# Patient Record
Sex: MALE | Race: WHITE | NOT HISPANIC OR LATINO
[De-identification: names, ages, dates, MRNs, and addresses within clinical notes are randomized per-mention and may not be internally consistent; named-entity substitution may affect disease eponyms.]

---

## 2018-08-22 ENCOUNTER — TRANSCRIPTION ENCOUNTER (OUTPATIENT)
Age: 78
End: 2018-08-22

## 2019-01-17 PROBLEM — Z00.00 ENCOUNTER FOR PREVENTIVE HEALTH EXAMINATION: Status: ACTIVE | Noted: 2019-01-17

## 2019-03-01 ENCOUNTER — APPOINTMENT (OUTPATIENT)
Dept: OPHTHALMOLOGY | Facility: CLINIC | Age: 79
End: 2019-03-01
Payer: MEDICARE

## 2019-03-01 DIAGNOSIS — H25.13 AGE-RELATED NUCLEAR CATARACT, BILATERAL: ICD-10-CM

## 2019-03-01 DIAGNOSIS — H35.30 UNSPECIFIED MACULAR DEGENERATION: ICD-10-CM

## 2019-03-01 PROCEDURE — 92004 COMPRE OPH EXAM NEW PT 1/>: CPT

## 2019-03-01 PROCEDURE — 92134 CPTRZ OPH DX IMG PST SGM RTA: CPT

## 2019-03-01 PROCEDURE — 92136 OPHTHALMIC BIOMETRY: CPT

## 2019-03-05 PROBLEM — H25.13 SENILE NUCLEAR CATARACT, BILATERAL: Status: ACTIVE | Noted: 2019-03-05

## 2019-03-05 PROBLEM — H35.30 AMD (AGE RELATED MACULAR DEGENERATION): Status: ACTIVE | Noted: 2019-03-05

## 2019-03-29 ENCOUNTER — APPOINTMENT (OUTPATIENT)
Dept: OPHTHALMOLOGY | Facility: CLINIC | Age: 79
End: 2019-03-29
Payer: MEDICARE

## 2019-03-29 DIAGNOSIS — H40.003 PREGLAUCOMA, UNSPECIFIED, BILATERAL: ICD-10-CM

## 2019-03-29 PROCEDURE — 92012 INTRM OPH EXAM EST PATIENT: CPT

## 2019-03-29 PROCEDURE — 92083 EXTENDED VISUAL FIELD XM: CPT

## 2019-03-29 RX ORDER — TRAVOPROST 0.04 MG/ML
0 SOLUTION/ DROPS OPHTHALMIC
Qty: 1 | Refills: 6 | Status: ACTIVE | COMMUNITY
Start: 2019-03-29 | End: 1900-01-01

## 2019-04-17 ENCOUNTER — APPOINTMENT (OUTPATIENT)
Dept: OPHTHALMOLOGY | Facility: CLINIC | Age: 79
End: 2019-04-17
Payer: MEDICARE

## 2019-04-17 ENCOUNTER — APPOINTMENT (OUTPATIENT)
Dept: OPHTHALMOLOGY | Facility: CLINIC | Age: 79
End: 2019-04-17

## 2019-04-17 PROCEDURE — 92020 GONIOSCOPY: CPT

## 2019-04-17 PROCEDURE — 92014 COMPRE OPH EXAM EST PT 1/>: CPT

## 2019-04-17 RX ORDER — TRAVOPROST 0.04 MG/ML
0 SOLUTION/ DROPS OPHTHALMIC
Qty: 1 | Refills: 3 | Status: ACTIVE | COMMUNITY
Start: 2019-04-17 | End: 1900-01-01

## 2019-07-24 ENCOUNTER — NON-APPOINTMENT (OUTPATIENT)
Age: 79
End: 2019-07-24

## 2019-07-24 ENCOUNTER — APPOINTMENT (OUTPATIENT)
Dept: OPHTHALMOLOGY | Facility: CLINIC | Age: 79
End: 2019-07-24
Payer: MEDICARE

## 2019-07-24 PROCEDURE — 92012 INTRM OPH EXAM EST PATIENT: CPT

## 2019-07-24 PROCEDURE — 92083 EXTENDED VISUAL FIELD XM: CPT

## 2019-08-13 ENCOUNTER — NON-APPOINTMENT (OUTPATIENT)
Age: 79
End: 2019-08-13

## 2019-08-13 ENCOUNTER — APPOINTMENT (OUTPATIENT)
Dept: OPHTHALMOLOGY | Facility: CLINIC | Age: 79
End: 2019-08-13
Payer: MEDICARE

## 2019-08-13 PROCEDURE — 92012 INTRM OPH EXAM EST PATIENT: CPT

## 2019-09-26 ENCOUNTER — APPOINTMENT (OUTPATIENT)
Dept: OPHTHALMOLOGY | Facility: CLINIC | Age: 79
End: 2019-09-26

## 2019-09-26 ENCOUNTER — APPOINTMENT (OUTPATIENT)
Dept: OPHTHALMOLOGY | Facility: CLINIC | Age: 79
End: 2019-09-26
Payer: MEDICARE

## 2019-09-26 ENCOUNTER — NON-APPOINTMENT (OUTPATIENT)
Age: 79
End: 2019-09-26

## 2019-09-26 PROCEDURE — 92331: CPT | Mod: NC

## 2019-12-05 ENCOUNTER — APPOINTMENT (OUTPATIENT)
Dept: OPHTHALMOLOGY | Facility: CLINIC | Age: 79
End: 2019-12-05
Payer: MEDICARE

## 2019-12-05 ENCOUNTER — NON-APPOINTMENT (OUTPATIENT)
Age: 79
End: 2019-12-05

## 2019-12-05 PROCEDURE — 92012 INTRM OPH EXAM EST PATIENT: CPT

## 2019-12-11 ENCOUNTER — APPOINTMENT (OUTPATIENT)
Dept: OPHTHALMOLOGY | Facility: AMBULATORY SURGERY CENTER | Age: 79
End: 2019-12-11

## 2019-12-18 ENCOUNTER — NON-APPOINTMENT (OUTPATIENT)
Age: 79
End: 2019-12-18

## 2019-12-18 ENCOUNTER — TRANSCRIPTION ENCOUNTER (OUTPATIENT)
Age: 79
End: 2019-12-18

## 2019-12-18 ENCOUNTER — OUTPATIENT (OUTPATIENT)
Dept: OUTPATIENT SERVICES | Facility: HOSPITAL | Age: 79
LOS: 1 days | Discharge: HOME | End: 2019-12-18

## 2019-12-18 ENCOUNTER — APPOINTMENT (OUTPATIENT)
Dept: OPHTHALMOLOGY | Facility: AMBULATORY SURGERY CENTER | Age: 79
End: 2019-12-18
Payer: MEDICARE

## 2019-12-18 PROCEDURE — 66984 XCAPSL CTRC RMVL W/O ECP: CPT | Mod: RT

## 2019-12-19 ENCOUNTER — NON-APPOINTMENT (OUTPATIENT)
Age: 79
End: 2019-12-19

## 2019-12-19 ENCOUNTER — APPOINTMENT (OUTPATIENT)
Dept: OPHTHALMOLOGY | Facility: CLINIC | Age: 79
End: 2019-12-19
Payer: MEDICARE

## 2019-12-19 PROCEDURE — 99024 POSTOP FOLLOW-UP VISIT: CPT

## 2019-12-24 ENCOUNTER — APPOINTMENT (OUTPATIENT)
Dept: OPHTHALMOLOGY | Facility: CLINIC | Age: 79
End: 2019-12-24
Payer: MEDICARE

## 2019-12-24 ENCOUNTER — NON-APPOINTMENT (OUTPATIENT)
Age: 79
End: 2019-12-24

## 2019-12-24 PROCEDURE — 99024 POSTOP FOLLOW-UP VISIT: CPT

## 2020-01-23 ENCOUNTER — APPOINTMENT (OUTPATIENT)
Dept: OPHTHALMOLOGY | Facility: CLINIC | Age: 80
End: 2020-01-23
Payer: MEDICARE

## 2020-01-23 ENCOUNTER — NON-APPOINTMENT (OUTPATIENT)
Age: 80
End: 2020-01-23

## 2020-01-23 PROCEDURE — 99024 POSTOP FOLLOW-UP VISIT: CPT

## 2020-01-23 PROCEDURE — 92134 CPTRZ OPH DX IMG PST SGM RTA: CPT

## 2020-01-28 ENCOUNTER — APPOINTMENT (OUTPATIENT)
Dept: OPHTHALMOLOGY | Facility: CLINIC | Age: 80
End: 2020-01-28
Payer: MEDICARE

## 2020-01-28 ENCOUNTER — NON-APPOINTMENT (OUTPATIENT)
Age: 80
End: 2020-01-28

## 2020-01-28 PROCEDURE — 92012 INTRM OPH EXAM EST PATIENT: CPT | Mod: 24

## 2020-01-28 PROCEDURE — 92133 CPTRZD OPH DX IMG PST SGM ON: CPT

## 2020-01-28 PROCEDURE — 92083 EXTENDED VISUAL FIELD XM: CPT

## 2020-02-27 ENCOUNTER — APPOINTMENT (OUTPATIENT)
Dept: OTOLARYNGOLOGY | Facility: CLINIC | Age: 80
End: 2020-02-27
Payer: MEDICARE

## 2020-02-27 PROCEDURE — V5010 ASSESSMENT FOR HEARING AID: CPT

## 2020-03-13 ENCOUNTER — APPOINTMENT (OUTPATIENT)
Dept: PHARMACY | Facility: CLINIC | Age: 80
End: 2020-03-13

## 2020-04-29 ENCOUNTER — APPOINTMENT (OUTPATIENT)
Dept: OPHTHALMOLOGY | Facility: CLINIC | Age: 80
End: 2020-04-29
Payer: MEDICARE

## 2020-04-29 ENCOUNTER — NON-APPOINTMENT (OUTPATIENT)
Age: 80
End: 2020-04-29

## 2020-04-29 PROCEDURE — 99212 OFFICE O/P EST SF 10 MIN: CPT | Mod: 95

## 2020-05-26 ENCOUNTER — APPOINTMENT (OUTPATIENT)
Dept: OPHTHALMOLOGY | Facility: CLINIC | Age: 80
End: 2020-05-26

## 2020-06-30 ENCOUNTER — APPOINTMENT (OUTPATIENT)
Dept: OPHTHALMOLOGY | Facility: CLINIC | Age: 80
End: 2020-06-30

## 2020-07-23 ENCOUNTER — APPOINTMENT (OUTPATIENT)
Dept: OPHTHALMOLOGY | Facility: CLINIC | Age: 80
End: 2020-07-23

## 2022-01-07 ENCOUNTER — NON-APPOINTMENT (OUTPATIENT)
Age: 82
End: 2022-01-07

## 2022-01-07 ENCOUNTER — APPOINTMENT (OUTPATIENT)
Dept: OPHTHALMOLOGY | Facility: CLINIC | Age: 82
End: 2022-01-07
Payer: MEDICARE

## 2022-01-07 PROCEDURE — 92134 CPTRZ OPH DX IMG PST SGM RTA: CPT

## 2022-01-07 PROCEDURE — 92014 COMPRE OPH EXAM EST PT 1/>: CPT

## 2022-02-28 ENCOUNTER — TRANSCRIPTION ENCOUNTER (OUTPATIENT)
Age: 82
End: 2022-02-28

## 2022-02-28 RX ORDER — OFLOXACIN 0.3 %
1 DROPS OPHTHALMIC (EYE)
Refills: 0 | Status: DISCONTINUED | OUTPATIENT
Start: 2022-03-02 | End: 2022-03-02

## 2022-02-28 RX ORDER — KETOROLAC TROMETHAMINE 0.5 %
1 DROPS OPHTHALMIC (EYE)
Refills: 0 | Status: DISCONTINUED | OUTPATIENT
Start: 2022-03-02 | End: 2022-03-02

## 2022-02-28 RX ORDER — SODIUM CHLORIDE 9 MG/ML
1000 INJECTION, SOLUTION INTRAVENOUS
Refills: 0 | Status: DISCONTINUED | OUTPATIENT
Start: 2022-03-02 | End: 2022-03-02

## 2022-02-28 RX ORDER — TROPICAMIDE 1 %
1 DROPS OPHTHALMIC (EYE)
Refills: 0 | Status: DISCONTINUED | OUTPATIENT
Start: 2022-03-02 | End: 2022-03-02

## 2022-02-28 RX ORDER — PHENYLEPHRINE HCL 2.5 %
1 DROPS OPHTHALMIC (EYE)
Refills: 0 | Status: DISCONTINUED | OUTPATIENT
Start: 2022-03-02 | End: 2022-03-02

## 2022-02-28 NOTE — OPERATIVE REPORT - OPERATIVE RPOSRT DETAILS
DATE OF SURGERY:    PRE-OP DIAGNOSIS: Cataract Left Eye    POST-OP DIAGNOSIS: Same    ANESTHESIA: MAC    PROCEDURE: Cataract extraction with intraocular lens implant left eye    SPECIMEN/TISSUE REMOVED: None    ESTIMATED BLOOD LOSS: < 1mL    COMPLICATIONS: None    The patient was brought to the operating room.  A drop of topical anesthetic was placed in the eye. The patient was prepped and draped in the usual sterile fashion, including Betadine drops in the eye. A lid speculum was placed between the lids of the left eye. A paracentesis was made inferior. Intracameral preservative free lidocaine was instilled and the anterior chamber was filled with air, trypan blue, and viscoelastic.. A clear cornea temporal incision was created using a 2.4mm keratome. A continuous curvilinear capsulorhexis was started with a cystotome and completed with capsulorhexis forceps. The lens was hydrodissected with BSS. The lens was then phacoemulsified using a divide and conquer technique. Residual cortical material was removed using automated irrigation and aspiration. The capsular bag was reformed using a viscoelastic. A ______ lens was inserted into the bag. Symmetric capsular bag fixation was confirmed. The remaining viscoelastic was removed with automated irrigation and aspiration. The wounds were hydrated and checked to be water tight. The lid speculum was removed. Antibiotic/steroid ointment was instilled in the eye and a shield was placed over the eye. The patient left the OR in stable condition having tolerated the procedure well. Arlen HUYNH was present throughout the case. DATE OF SURGERY:    PRE-OP DIAGNOSIS: Cataract Left Eye    POST-OP DIAGNOSIS: Same    ANESTHESIA: MAC    PROCEDURE: Cataract extraction with intraocular lens implant left eye    SPECIMEN/TISSUE REMOVED: None    ESTIMATED BLOOD LOSS: < 1mL    COMPLICATIONS: None    The patient was brought to the operating room.  A drop of topical anesthetic was placed in the eye. The patient was prepped and draped in the usual sterile fashion, including Betadine drops in the eye. A lid speculum was placed between the lids of the left eye. A paracentesis was made inferior. Intracameral preservative free lidocaine was instilled and the anterior chamber was filled with air, trypan blue, and viscoelastic.. A clear cornea temporal incision was created using a 2.4mm keratome. A continuous curvilinear capsulorhexis was started with a cystotome and completed with capsulorhexis forceps. The lens was hydrodissected with BSS. The lens was then phacoemulsified using a divide and conquer technique. Residual cortical material was removed using automated irrigation and aspiration. The capsular bag was reformed using a viscoelastic. A _SN60WF 19.50 lens was inserted into the bag. Symmetric capsular bag fixation was confirmed. The remaining viscoelastic was removed with automated irrigation and aspiration. The wounds were hydrated and checked to be water tight. The lid speculum was removed. Antibiotic/steroid ointment was instilled in the eye and a shield was placed over the eye. The patient left the OR in stable condition having tolerated the procedure well. Arlen HUYNH was present throughout the case. DATE OF SURGERY:3/2/2022    Surgeon: Arlen Hubbard    PRE-OP DIAGNOSIS: Cataract Left Eye    POST-OP DIAGNOSIS: Same    ANESTHESIA: MAC    PROCEDURE: Cataract extraction with intraocular lens implant left eye    SPECIMEN/TISSUE REMOVED: None    ESTIMATED BLOOD LOSS: < 1mL    COMPLICATIONS: None    The patient was brought to the operating room.  A drop of topical anesthetic was placed in the eye. The patient was prepped and draped in the usual sterile fashion, including Betadine drops in the eye. A lid speculum was placed between the lids of the left eye. A paracentesis was made inferior. Intracameral preservative free lidocaine was instilled and the anterior chamber was filled with air, trypan blue, and viscoelastic.. A clear cornea temporal incision was created using a 2.4mm keratome. A continuous curvilinear capsulorhexis was started with a cystotome and completed with capsulorhexis forceps. The lens was hydrodissected with BSS. The lens was then phacoemulsified using a divide and conquer technique. Residual cortical material was removed using automated irrigation and aspiration. The capsular bag was reformed using a viscoelastic. A _SN60WF 19.50 lens was inserted into the bag. Symmetric capsular bag fixation was confirmed. The remaining viscoelastic was removed with automated irrigation and aspiration. The wounds were hydrated and checked to be water tight. The lid speculum was removed. Antibiotic/steroid ointment was instilled in the eye and a shield was placed over the eye. The patient left the OR in stable condition having tolerated the procedure well. IArlen was present throughout the case.

## 2022-03-01 NOTE — ASU PATIENT PROFILE, ADULT - FALL HARM RISK - UNIVERSAL INTERVENTIONS
Bed in lowest position, wheels locked, appropriate side rails in place/Call bell, personal items and telephone in reach/Instruct patient to call for assistance before getting out of bed or chair/Non-slip footwear when patient is out of bed/[place] to call system/Physically safe environment - no spills, clutter or unnecessary equipment/Purposeful Proactive Rounding/Room/bathroom lighting operational, light cord in reach

## 2022-03-02 ENCOUNTER — OUTPATIENT (OUTPATIENT)
Dept: OUTPATIENT SERVICES | Facility: HOSPITAL | Age: 82
LOS: 1 days | Discharge: HOME | End: 2022-03-02
Payer: MEDICARE

## 2022-03-02 ENCOUNTER — NON-APPOINTMENT (OUTPATIENT)
Age: 82
End: 2022-03-02

## 2022-03-02 ENCOUNTER — APPOINTMENT (OUTPATIENT)
Dept: OPHTHALMOLOGY | Facility: AMBULATORY SURGERY CENTER | Age: 82
End: 2022-03-02

## 2022-03-02 VITALS
TEMPERATURE: 98 F | HEART RATE: 52 BPM | DIASTOLIC BLOOD PRESSURE: 70 MMHG | HEIGHT: 67 IN | RESPIRATION RATE: 16 BRPM | WEIGHT: 158.51 LBS | SYSTOLIC BLOOD PRESSURE: 136 MMHG | OXYGEN SATURATION: 97 %

## 2022-03-02 VITALS
DIASTOLIC BLOOD PRESSURE: 78 MMHG | TEMPERATURE: 98 F | HEART RATE: 57 BPM | RESPIRATION RATE: 16 BRPM | OXYGEN SATURATION: 100 % | SYSTOLIC BLOOD PRESSURE: 152 MMHG

## 2022-03-02 DIAGNOSIS — Z98.49 CATARACT EXTRACTION STATUS, UNSPECIFIED EYE: Chronic | ICD-10-CM

## 2022-03-02 PROCEDURE — 66984 XCAPSL CTRC RMVL W/O ECP: CPT | Mod: LT

## 2022-03-02 DEVICE — LENS IOL ACRYSOF SN60WF 19.5D
Type: IMPLANTABLE DEVICE | Status: NON-FUNCTIONAL
Removed: 2022-03-02

## 2022-03-02 RX ORDER — SODIUM CHLORIDE 9 MG/ML
500 INJECTION, SOLUTION INTRAVENOUS
Refills: 0 | Status: DISCONTINUED | OUTPATIENT
Start: 2022-03-02 | End: 2022-03-02

## 2022-03-02 RX ORDER — SIMVASTATIN 20 MG/1
1 TABLET, FILM COATED ORAL
Qty: 0 | Refills: 0 | DISCHARGE

## 2022-03-02 RX ORDER — ACETAMINOPHEN 500 MG
650 TABLET ORAL ONCE
Refills: 0 | Status: DISCONTINUED | OUTPATIENT
Start: 2022-03-02 | End: 2022-03-02

## 2022-03-02 RX ORDER — ASPIRIN/CALCIUM CARB/MAGNESIUM 324 MG
1 TABLET ORAL
Qty: 0 | Refills: 0 | DISCHARGE

## 2022-03-02 RX ORDER — METOPROLOL TARTRATE 50 MG
1 TABLET ORAL
Qty: 0 | Refills: 0 | DISCHARGE

## 2022-03-02 RX ORDER — LORATADINE 10 MG/1
1 TABLET ORAL
Qty: 0 | Refills: 0 | DISCHARGE

## 2022-03-02 RX ORDER — ZOLPIDEM TARTRATE 10 MG/1
1 TABLET ORAL
Qty: 0 | Refills: 0 | DISCHARGE

## 2022-03-02 RX ORDER — AMLODIPINE BESYLATE 2.5 MG/1
1 TABLET ORAL
Qty: 0 | Refills: 0 | DISCHARGE

## 2022-03-02 NOTE — ASU DISCHARGE PLAN (ADULT/PEDIATRIC) - NS MD DC FALL RISK RISK
For information on Fall & Injury Prevention, visit: [email or web address] OR  [email or web address] OR  https://www.cdc.gov/steadi/patient.html

## 2022-03-03 ENCOUNTER — APPOINTMENT (OUTPATIENT)
Dept: OPHTHALMOLOGY | Facility: CLINIC | Age: 82
End: 2022-03-03
Payer: MEDICARE

## 2022-03-03 ENCOUNTER — NON-APPOINTMENT (OUTPATIENT)
Age: 82
End: 2022-03-03

## 2022-03-03 PROCEDURE — 99024 POSTOP FOLLOW-UP VISIT: CPT

## 2022-03-09 ENCOUNTER — NON-APPOINTMENT (OUTPATIENT)
Age: 82
End: 2022-03-09

## 2022-03-09 ENCOUNTER — APPOINTMENT (OUTPATIENT)
Dept: OPHTHALMOLOGY | Facility: CLINIC | Age: 82
End: 2022-03-09
Payer: MEDICARE

## 2022-03-09 PROBLEM — I34.0 NONRHEUMATIC MITRAL (VALVE) INSUFFICIENCY: Chronic | Status: ACTIVE | Noted: 2022-03-02

## 2022-03-09 PROBLEM — I10 ESSENTIAL (PRIMARY) HYPERTENSION: Chronic | Status: ACTIVE | Noted: 2022-03-02

## 2022-03-09 PROCEDURE — 99024 POSTOP FOLLOW-UP VISIT: CPT

## 2022-04-11 ENCOUNTER — NON-APPOINTMENT (OUTPATIENT)
Age: 82
End: 2022-04-11

## 2022-04-11 ENCOUNTER — APPOINTMENT (OUTPATIENT)
Dept: OPHTHALMOLOGY | Facility: CLINIC | Age: 82
End: 2022-04-11
Payer: MEDICARE

## 2022-04-11 PROCEDURE — 99024 POSTOP FOLLOW-UP VISIT: CPT

## 2022-04-12 ENCOUNTER — TRANSCRIPTION ENCOUNTER (OUTPATIENT)
Age: 82
End: 2022-04-12

## 2022-08-17 ENCOUNTER — NON-APPOINTMENT (OUTPATIENT)
Age: 82
End: 2022-08-17

## 2022-08-17 ENCOUNTER — APPOINTMENT (OUTPATIENT)
Dept: OPHTHALMOLOGY | Facility: CLINIC | Age: 82
End: 2022-08-17

## 2022-08-17 PROCEDURE — 92012 INTRM OPH EXAM EST PATIENT: CPT

## 2022-08-17 PROCEDURE — 92134 CPTRZ OPH DX IMG PST SGM RTA: CPT

## 2022-08-29 ENCOUNTER — NON-APPOINTMENT (OUTPATIENT)
Age: 82
End: 2022-08-29

## 2022-09-01 ENCOUNTER — APPOINTMENT (OUTPATIENT)
Dept: OPHTHALMOLOGY | Facility: CLINIC | Age: 82
End: 2022-09-01

## 2022-09-01 ENCOUNTER — NON-APPOINTMENT (OUTPATIENT)
Age: 82
End: 2022-09-01

## 2022-09-01 PROCEDURE — 92014 COMPRE OPH EXAM EST PT 1/>: CPT

## 2022-09-01 PROCEDURE — 92250 FUNDUS PHOTOGRAPHY W/I&R: CPT

## 2022-11-16 ENCOUNTER — APPOINTMENT (OUTPATIENT)
Dept: OPHTHALMOLOGY | Facility: CLINIC | Age: 82
End: 2022-11-16

## 2022-11-16 ENCOUNTER — NON-APPOINTMENT (OUTPATIENT)
Age: 82
End: 2022-11-16

## 2022-11-16 PROCEDURE — 92014 COMPRE OPH EXAM EST PT 1/>: CPT

## 2022-11-16 PROCEDURE — 92133 CPTRZD OPH DX IMG PST SGM ON: CPT

## 2022-11-16 PROCEDURE — 92083 EXTENDED VISUAL FIELD XM: CPT

## 2023-03-22 ENCOUNTER — NON-APPOINTMENT (OUTPATIENT)
Age: 83
End: 2023-03-22

## 2023-03-22 ENCOUNTER — APPOINTMENT (OUTPATIENT)
Dept: OPHTHALMOLOGY | Facility: CLINIC | Age: 83
End: 2023-03-22
Payer: MEDICARE

## 2023-03-22 PROCEDURE — 92012 INTRM OPH EXAM EST PATIENT: CPT

## 2023-05-16 ENCOUNTER — NON-APPOINTMENT (OUTPATIENT)
Age: 83
End: 2023-05-16

## 2023-05-16 ENCOUNTER — APPOINTMENT (OUTPATIENT)
Dept: OPHTHALMOLOGY | Facility: CLINIC | Age: 83
End: 2023-05-16
Payer: MEDICARE

## 2023-05-16 PROCEDURE — 92014 COMPRE OPH EXAM EST PT 1/>: CPT

## 2023-09-07 ENCOUNTER — APPOINTMENT (OUTPATIENT)
Dept: OPHTHALMOLOGY | Facility: CLINIC | Age: 83
End: 2023-09-07
Payer: MEDICARE

## 2023-09-07 ENCOUNTER — NON-APPOINTMENT (OUTPATIENT)
Age: 83
End: 2023-09-07

## 2023-09-07 PROCEDURE — 92250 FUNDUS PHOTOGRAPHY W/I&R: CPT

## 2023-09-07 PROCEDURE — 92014 COMPRE OPH EXAM EST PT 1/>: CPT

## 2023-10-11 ENCOUNTER — APPOINTMENT (OUTPATIENT)
Dept: OPHTHALMOLOGY | Facility: CLINIC | Age: 83
End: 2023-10-11
Payer: MEDICARE

## 2023-10-11 ENCOUNTER — NON-APPOINTMENT (OUTPATIENT)
Age: 83
End: 2023-10-11

## 2023-10-11 PROCEDURE — 92133 CPTRZD OPH DX IMG PST SGM ON: CPT

## 2023-10-11 PROCEDURE — 92012 INTRM OPH EXAM EST PATIENT: CPT

## 2024-02-21 ENCOUNTER — APPOINTMENT (OUTPATIENT)
Dept: OPHTHALMOLOGY | Facility: CLINIC | Age: 84
End: 2024-02-21
Payer: MEDICARE

## 2024-02-21 ENCOUNTER — NON-APPOINTMENT (OUTPATIENT)
Age: 84
End: 2024-02-21

## 2024-02-21 PROCEDURE — 92012 INTRM OPH EXAM EST PATIENT: CPT

## 2024-06-19 ENCOUNTER — APPOINTMENT (OUTPATIENT)
Dept: OPHTHALMOLOGY | Facility: CLINIC | Age: 84
End: 2024-06-19
Payer: MEDICARE

## 2024-06-19 ENCOUNTER — NON-APPOINTMENT (OUTPATIENT)
Age: 84
End: 2024-06-19

## 2024-06-19 PROCEDURE — 92012 INTRM OPH EXAM EST PATIENT: CPT

## 2024-09-19 NOTE — ASU PREOP CHECKLIST - LOOSE TEETH
What Type Of Note Output Would You Prefer (Optional)?: Bullet Format
Hpi Title: Evaluation of Skin Lesions
How Severe Are Your Spot(S)?: mild
Have Your Spot(S) Been Treated In The Past?: has not been treated
Additional History: Patient is here for a concerning itchy tender lesion on the scalp.
no

## 2024-09-20 ENCOUNTER — APPOINTMENT (OUTPATIENT)
Dept: OTOLARYNGOLOGY | Facility: CLINIC | Age: 84
End: 2024-09-20
Payer: MEDICARE

## 2024-09-20 DIAGNOSIS — H90.5 UNSPECIFIED SENSORINEURAL HEARING LOSS: ICD-10-CM

## 2024-09-20 DIAGNOSIS — H90.3 SENSORINEURAL HEARING LOSS, BILATERAL: ICD-10-CM

## 2024-09-20 PROCEDURE — 92567 TYMPANOMETRY: CPT

## 2024-09-20 PROCEDURE — 99202 OFFICE O/P NEW SF 15 MIN: CPT

## 2024-09-20 PROCEDURE — 92557 COMPREHENSIVE HEARING TEST: CPT

## 2024-09-20 RX ORDER — LISINOPRIL 30 MG/1
TABLET ORAL
Refills: 0 | Status: ACTIVE | COMMUNITY

## 2024-09-20 RX ORDER — SILDENAFIL 100 MG/1
100 TABLET, FILM COATED ORAL
Refills: 0 | Status: ACTIVE | COMMUNITY

## 2024-09-20 RX ORDER — FLUTICASONE PROPIONATE 50 UG/1
SPRAY, METERED NASAL
Refills: 0 | Status: ACTIVE | COMMUNITY

## 2024-09-20 RX ORDER — AMLODIPINE BESYLATE 5 MG/1
TABLET ORAL
Refills: 0 | Status: ACTIVE | COMMUNITY

## 2024-09-20 RX ORDER — METOPROLOL TARTRATE 75 MG/1
TABLET, FILM COATED ORAL
Refills: 0 | Status: ACTIVE | COMMUNITY

## 2024-09-20 RX ORDER — SIMVASTATIN 80 MG/1
TABLET, FILM COATED ORAL
Refills: 0 | Status: ACTIVE | COMMUNITY

## 2024-09-20 RX ORDER — TADALAFIL 2.5 MG/1
TABLET, FILM COATED ORAL
Refills: 0 | Status: ACTIVE | COMMUNITY

## 2024-09-20 NOTE — ASSESSMENT
[FreeTextEntry1] : I compared today's hearing test with his hearing test from 2021.  Essentially unchanged. Would recommend follow-up testing if he notices change in hearing. No further workup required at this time.

## 2024-09-20 NOTE — HISTORY OF PRESENT ILLNESS
[de-identified] : Initial visit. Has a history of sensorineural hearing loss and uses hearing aid amplification. He is here today for surveillance.

## 2024-09-23 ENCOUNTER — NON-APPOINTMENT (OUTPATIENT)
Age: 84
End: 2024-09-23

## 2024-09-24 ENCOUNTER — NON-APPOINTMENT (OUTPATIENT)
Age: 84
End: 2024-09-24

## 2024-09-24 ENCOUNTER — APPOINTMENT (OUTPATIENT)
Dept: OPHTHALMOLOGY | Facility: CLINIC | Age: 84
End: 2024-09-24
Payer: MEDICARE

## 2024-09-24 PROCEDURE — 92250 FUNDUS PHOTOGRAPHY W/I&R: CPT

## 2024-09-24 PROCEDURE — 92014 COMPRE OPH EXAM EST PT 1/>: CPT

## 2024-10-20 ENCOUNTER — NON-APPOINTMENT (OUTPATIENT)
Age: 84
End: 2024-10-20

## 2024-10-30 ENCOUNTER — APPOINTMENT (OUTPATIENT)
Dept: OPHTHALMOLOGY | Facility: CLINIC | Age: 84
End: 2024-10-30

## 2025-07-15 ENCOUNTER — NON-APPOINTMENT (OUTPATIENT)
Age: 85
End: 2025-07-15

## 2025-07-15 ENCOUNTER — APPOINTMENT (OUTPATIENT)
Dept: OPHTHALMOLOGY | Facility: CLINIC | Age: 85
End: 2025-07-15
Payer: MEDICARE

## 2025-07-15 PROCEDURE — 92014 COMPRE OPH EXAM EST PT 1/>: CPT

## (undated) DEVICE — TIP OZIL 12 DEGREE MINI FLARE

## (undated) DEVICE — PACK ANTERIOR SEGMENT

## (undated) DEVICE — SUT NYLON 10-0 12" CU-5

## (undated) DEVICE — DRAPE MICROSCOPE KNOB COVER SMALL (2 PCS)

## (undated) DEVICE — SOL SYR OPHTHALMIC VISION BLUE TRYPAN BLUE 0.06% 0.5 ML

## (undated) DEVICE — GOWN SLEEVES

## (undated) DEVICE — TRANSFORMER INTREPID I/A 0.3MM

## (undated) DEVICE — Device

## (undated) DEVICE — GLV 6.5 PROTEXIS W HYDROGEL

## (undated) DEVICE — KNIFE ALCON PARACENTESIS CLEARCUT SIDEPORT 1MM (YELLOW)

## (undated) DEVICE — CANNULA IRR ANT CHAMBER 30G

## (undated) DEVICE — PACK CENTURION 2.4MM

## (undated) DEVICE — SOL IRR BAL SALT 500ML